# Patient Record
Sex: FEMALE | Race: BLACK OR AFRICAN AMERICAN | NOT HISPANIC OR LATINO | URBAN - METROPOLITAN AREA
[De-identification: names, ages, dates, MRNs, and addresses within clinical notes are randomized per-mention and may not be internally consistent; named-entity substitution may affect disease eponyms.]

---

## 2023-04-30 ENCOUNTER — EMERGENCY (EMERGENCY)
Facility: HOSPITAL | Age: 68
LOS: 0 days | Discharge: HOME | End: 2023-04-30
Attending: STUDENT IN AN ORGANIZED HEALTH CARE EDUCATION/TRAINING PROGRAM
Payer: MEDICARE

## 2023-04-30 VITALS
DIASTOLIC BLOOD PRESSURE: 81 MMHG | SYSTOLIC BLOOD PRESSURE: 126 MMHG | OXYGEN SATURATION: 98 % | HEART RATE: 82 BPM | TEMPERATURE: 98 F | RESPIRATION RATE: 16 BRPM

## 2023-04-30 VITALS
SYSTOLIC BLOOD PRESSURE: 148 MMHG | TEMPERATURE: 98 F | HEIGHT: 67 IN | HEART RATE: 100 BPM | WEIGHT: 214.95 LBS | RESPIRATION RATE: 18 BRPM | OXYGEN SATURATION: 99 % | DIASTOLIC BLOOD PRESSURE: 84 MMHG
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
## 2023-04-30 DIAGNOSIS — M62.838 OTHER MUSCLE SPASM: ICD-10-CM

## 2023-04-30 DIAGNOSIS — Z88.0 ALLERGY STATUS TO PENICILLIN: ICD-10-CM

## 2023-04-30 DIAGNOSIS — Z90.710 ACQUIRED ABSENCE OF BOTH CERVIX AND UTERUS: ICD-10-CM

## 2023-04-30 DIAGNOSIS — Z87.19 PERSONAL HISTORY OF OTHER DISEASES OF THE DIGESTIVE SYSTEM: ICD-10-CM

## 2023-04-30 DIAGNOSIS — Z94.84 STEM CELLS TRANSPLANT STATUS: ICD-10-CM

## 2023-04-30 DIAGNOSIS — Z94.84 STEM CELLS TRANSPLANT STATUS: Chronic | ICD-10-CM

## 2023-04-30 DIAGNOSIS — T14.8XXA OTHER INJURY OF UNSPECIFIED BODY REGION, INITIAL ENCOUNTER: ICD-10-CM

## 2023-04-30 DIAGNOSIS — Z90.710 ACQUIRED ABSENCE OF BOTH CERVIX AND UTERUS: Chronic | ICD-10-CM

## 2023-04-30 DIAGNOSIS — Z88.8 ALLERGY STATUS TO OTHER DRUGS, MEDICAMENTS AND BIOLOGICAL SUBSTANCES: ICD-10-CM

## 2023-04-30 DIAGNOSIS — E78.5 HYPERLIPIDEMIA, UNSPECIFIED: ICD-10-CM

## 2023-04-30 DIAGNOSIS — Z87.39 PERSONAL HISTORY OF OTHER DISEASES OF THE MUSCULOSKELETAL SYSTEM AND CONNECTIVE TISSUE: ICD-10-CM
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
## 2023-04-30 DIAGNOSIS — I10 ESSENTIAL (PRIMARY) HYPERTENSION: ICD-10-CM

## 2023-04-30 LAB
ALBUMIN SERPL ELPH-MCNC: 3.9 G/DL — SIGNIFICANT CHANGE UP (ref 3.3–5)
ALP SERPL-CCNC: 85 U/L — SIGNIFICANT CHANGE UP (ref 40–120)
ALT FLD-CCNC: 34 U/L — SIGNIFICANT CHANGE UP (ref 12–78)
ANION GAP SERPL CALC-SCNC: 2 MMOL/L — LOW (ref 5–17)
APTT BLD: 38.6 SEC — HIGH (ref 27.5–35.5)
AST SERPL-CCNC: 25 U/L — SIGNIFICANT CHANGE UP (ref 15–37)
BASOPHILS # BLD AUTO: 0.02 K/UL — SIGNIFICANT CHANGE UP (ref 0–0.2)
BASOPHILS NFR BLD AUTO: 0.2 % — SIGNIFICANT CHANGE UP (ref 0–2)
BILIRUB SERPL-MCNC: 0.6 MG/DL — SIGNIFICANT CHANGE UP (ref 0.2–1.2)
BUN SERPL-MCNC: 15 MG/DL — SIGNIFICANT CHANGE UP (ref 7–23)
CALCIUM SERPL-MCNC: 9.9 MG/DL — SIGNIFICANT CHANGE UP (ref 8.5–10.1)
CHLORIDE SERPL-SCNC: 103 MMOL/L — SIGNIFICANT CHANGE UP (ref 96–108)
CK MB BLD-MCNC: 0.5 % — SIGNIFICANT CHANGE UP (ref 0–3.5)
CK MB CFR SERPL CALC: 1.3 NG/ML — SIGNIFICANT CHANGE UP (ref 0.5–3.6)
CK SERPL-CCNC: 270 U/L — HIGH (ref 26–192)
CO2 SERPL-SCNC: 30 MMOL/L — SIGNIFICANT CHANGE UP (ref 22–31)
CREAT SERPL-MCNC: 1.18 MG/DL — SIGNIFICANT CHANGE UP (ref 0.5–1.3)
EGFR: 50 ML/MIN/1.73M2 — LOW
EOSINOPHIL # BLD AUTO: 0.09 K/UL — SIGNIFICANT CHANGE UP (ref 0–0.5)
EOSINOPHIL NFR BLD AUTO: 1.1 % — SIGNIFICANT CHANGE UP (ref 0–6)
GLUCOSE SERPL-MCNC: 148 MG/DL — HIGH (ref 70–99)
HCT VFR BLD CALC: 43.7 % — SIGNIFICANT CHANGE UP (ref 34.5–45)
HGB BLD-MCNC: 14.3 G/DL — SIGNIFICANT CHANGE UP (ref 11.5–15.5)
IMM GRANULOCYTES NFR BLD AUTO: 0.1 % — SIGNIFICANT CHANGE UP (ref 0–0.9)
INR BLD: 1.02 RATIO — SIGNIFICANT CHANGE UP (ref 0.88–1.16)
LYMPHOCYTES # BLD AUTO: 2.42 K/UL — SIGNIFICANT CHANGE UP (ref 1–3.3)
LYMPHOCYTES # BLD AUTO: 28.3 % — SIGNIFICANT CHANGE UP (ref 13–44)
MCHC RBC-ENTMCNC: 28.5 PG — SIGNIFICANT CHANGE UP (ref 27–34)
MCHC RBC-ENTMCNC: 32.7 G/DL — SIGNIFICANT CHANGE UP (ref 32–36)
MCV RBC AUTO: 87.2 FL — SIGNIFICANT CHANGE UP (ref 80–100)
MONOCYTES # BLD AUTO: 0.4 K/UL — SIGNIFICANT CHANGE UP (ref 0–0.9)
MONOCYTES NFR BLD AUTO: 4.7 % — SIGNIFICANT CHANGE UP (ref 2–14)
NEUTROPHILS # BLD AUTO: 5.62 K/UL — SIGNIFICANT CHANGE UP (ref 1.8–7.4)
NEUTROPHILS NFR BLD AUTO: 65.6 % — SIGNIFICANT CHANGE UP (ref 43–77)
NRBC # BLD: 0 /100 WBCS — SIGNIFICANT CHANGE UP (ref 0–0)
PLATELET # BLD AUTO: 252 K/UL — SIGNIFICANT CHANGE UP (ref 150–400)
POTASSIUM SERPL-MCNC: 3.5 MMOL/L — SIGNIFICANT CHANGE UP (ref 3.5–5.3)
POTASSIUM SERPL-SCNC: 3.5 MMOL/L — SIGNIFICANT CHANGE UP (ref 3.5–5.3)
PROT SERPL-MCNC: 8.1 GM/DL — SIGNIFICANT CHANGE UP (ref 6–8.3)
PROTHROM AB SERPL-ACNC: 12.1 SEC — SIGNIFICANT CHANGE UP (ref 10.5–13.4)
RBC # BLD: 5.01 M/UL — SIGNIFICANT CHANGE UP (ref 3.8–5.2)
RBC # FLD: 13.2 % — SIGNIFICANT CHANGE UP (ref 10.3–14.5)
SODIUM SERPL-SCNC: 135 MMOL/L — SIGNIFICANT CHANGE UP (ref 135–145)
TROPONIN I, HIGH SENSITIVITY RESULT: 8.2 NG/L — SIGNIFICANT CHANGE UP
WBC # BLD: 8.56 K/UL — SIGNIFICANT CHANGE UP (ref 3.8–10.5)
WBC # FLD AUTO: 8.56 K/UL — SIGNIFICANT CHANGE UP (ref 3.8–10.5)

## 2023-04-30 PROCEDURE — 99284 EMERGENCY DEPT VISIT MOD MDM: CPT

## 2023-04-30 PROCEDURE — 93010 ELECTROCARDIOGRAM REPORT: CPT

## 2023-04-30 RX ORDER — METOPROLOL TARTRATE 50 MG
1 TABLET ORAL
Refills: 0 | DISCHARGE

## 2023-04-30 RX ORDER — ATORVASTATIN CALCIUM 80 MG/1
1 TABLET, FILM COATED ORAL
Refills: 0 | DISCHARGE

## 2023-04-30 RX ORDER — HYDROCHLOROTHIAZIDE 25 MG
1 TABLET ORAL
Refills: 0 | DISCHARGE

## 2023-04-30 RX ORDER — NIFEDIPINE 30 MG
1 TABLET, EXTENDED RELEASE 24 HR ORAL
Refills: 0 | DISCHARGE

## 2023-04-30 RX ORDER — METHOCARBAMOL 500 MG/1
1000 TABLET, FILM COATED ORAL ONCE
Refills: 0 | Status: COMPLETED | OUTPATIENT
Start: 2023-04-30 | End: 2023-04-30

## 2023-04-30 RX ORDER — METHOCARBAMOL 500 MG/1
2 TABLET, FILM COATED ORAL
Qty: 30 | Refills: 0
Start: 2023-04-30 | End: 2023-05-04

## 2023-04-30 RX ADMIN — METHOCARBAMOL 1000 MILLIGRAM(S): 500 TABLET, FILM COATED ORAL at 17:54

## 2023-04-30 NOTE — ED ADULT NURSE NOTE - NSICDXFAMILYHX_GEN_ALL_CORE_FT
FAMILY HISTORY:  Father  Still living? Unknown  FH: HTN (hypertension), Age at diagnosis: Age Unknown    Mother  Still living? Unknown  FH: HTN (hypertension), Age at diagnosis: Age Unknown    Grandparent  Still living? Unknown  FH: HTN (hypertension), Age at diagnosis: Age Unknown

## 2023-04-30 NOTE — ED PROVIDER NOTE - PATIENT PORTAL LINK FT
You can access the FollowMyHealth Patient Portal offered by [facility] by registering at the following website: [email or web address]. By joining [email or web address]’s FollowMyHealth portal, you will also be able to view your health information using other applications (apps) compatible with our system.

## 2023-04-30 NOTE — ED PROVIDER NOTE - CLINICAL SUMMARY MEDICAL DECISION MAKING FREE TEXT BOX
pt presents today c/o left lower extremity muscle spasms, has had this in the past, related to hypokalemia, pt also recent was at the gym and bench pressed 18 pounds, on exam no edema or calf pains noted, labs to rule out rhabdo and hypokalemia, robaxin given, will reassess and dispo

## 2023-04-30 NOTE — ED PROVIDER NOTE - OBJECTIVE STATEMENT
68 year old female with h/o HTN, HLD, GERD presents today c/o left lower extremity muscle spasms, pt states that she use to get them frequently and was related to hypokalemia due to her bp meds, pt does currently work out, on wednesday did bench presses using 18 pound weights,  (-) travel (-) swelling 68 year old female with h/o HTN, HLD, GERD presents today c/o left lower extremity muscle spasms, pt states that she use to get them frequently and was related to hypokalemia due to her bp meds, pt does currently work out, on wednesday did bench presses using 18 pound weights,  (-) travel (-) swelling (-) chest pain (-) sob

## 2023-04-30 NOTE — ED ADULT TRIAGE NOTE - CHIEF COMPLAINT QUOTE
pt c/o  intermittent left leg muscle spasm today. pt shoulder arm and left arm tightness today. hx: degenerative disc disease, HTN, high cholesterol. pt c/o  intermittent left leg muscle spasm today. pt left jaw, shoulder arm tightness today. hx: degenerative disc disease, HTN, high cholesterol.

## 2023-04-30 NOTE — ED ADULT NURSE NOTE - OBJECTIVE STATEMENT
patient is A&Ox4. Breathing unlabored on RA. patient appears anxious. PMH acid reflux, HTN, HLD, degenerative lumbar disc. patient complaining of intermittent left leg spasm. patient states it started while she was "marching at the [employer]". reports working out in the gym recently and lifting weights. denies any injuries, fall or trauma. reports similar episodes of leg spasm in the past. patient complaining of left jaw tightness/pain and left shoulder tightness. EKG done in triage. patient denies any numbness/tingling, cp, sob, difficulty breathing, abdominal pain, n/v/d, fever, chills. patient denies taking anything for pain prior to coming to ED. patient noted with active ROM in all extremities.

## 2023-04-30 NOTE — ED PROVIDER NOTE - NSICDXPASTMEDICALHX_GEN_ALL_CORE_FT
PAST MEDICAL HISTORY:  Acid reflux     Degenerative lumbar disc     HLD (hyperlipidemia)     HTN (hypertension)

## 2023-04-30 NOTE — ED ADULT NURSE NOTE - CHIEF COMPLAINT QUOTE
pt c/o  intermittent left leg muscle spasm today. pt left jaw, shoulder arm tightness today. hx: degenerative disc disease, HTN, high cholesterol.

## 2023-04-30 NOTE — ED ADULT NURSE NOTE - ED STAT RN HANDOFF DETAILS
Report given to Vane Yang RN. patient in stretcher. Breathing unlabored on RA. No acute or respiratory distress noted. endorsed all concerns to RN.

## 2023-04-30 NOTE — ED ADULT TRIAGE NOTE - HAVE YOU HAD COVID IN THE LAST 60 DAYS?
I have reviewed and agree to the content of the note written by the PTA.   Electronically signed by Johnny Moore PT [id number] No

## 2023-07-19 ENCOUNTER — OFFICE (OUTPATIENT)
Dept: URBAN - METROPOLITAN AREA CLINIC 35 | Facility: CLINIC | Age: 68
Setting detail: OPHTHALMOLOGY
End: 2023-07-19
Payer: MEDICARE

## 2023-07-19 DIAGNOSIS — D31.01: ICD-10-CM

## 2023-07-19 DIAGNOSIS — H11.153: ICD-10-CM

## 2023-07-19 DIAGNOSIS — H25.13: ICD-10-CM

## 2023-07-19 DIAGNOSIS — H44.23: ICD-10-CM

## 2023-07-19 PROCEDURE — 92014 COMPRE OPH EXAM EST PT 1/>: CPT | Performed by: OPHTHALMOLOGY

## 2023-07-19 ASSESSMENT — AXIALLENGTH_DERIVED
OS_AL: 28.5532
OD_AL: 28.5368
OD_AL: 28.6801
OS_AL: 29.0614
OD_AL: 28.5368
OS_AL: 28.6247
OS_AL: 28.5532
OD_AL: 28.5368

## 2023-07-19 ASSESSMENT — REFRACTION_CURRENTRX
OS_ADD: +3.00
OD_SPHERE: -10.00
OS_OVR_VA: 20/
OD_VPRISM_DIRECTION: PROGS
OD_AXIS: 130
OD_CYLINDER: -0.50
OD_OVR_VA: 20/
OS_SPHERE: -11.00
OS_AXIS: 060
OS_CYLINDER: -0.50
OS_VPRISM_DIRECTION: PROGS
OD_ADD: +3.00

## 2023-07-19 ASSESSMENT — SPHEQUIV_DERIVED
OS_SPHEQUIV: -10.875
OD_SPHEQUIV: -10.625
OS_SPHEQUIV: -10.875
OD_SPHEQUIV: -10.375
OD_SPHEQUIV: -10.375
OS_SPHEQUIV: -11
OD_SPHEQUIV: -10.375
OS_SPHEQUIV: -11.75

## 2023-07-19 ASSESSMENT — KERATOMETRY
OD_K1POWER_DIOPTERS: 43.00
OS_K2POWER_DIOPTERS: 44.50
OD_K2POWER_DIOPTERS: 43.75
OD_AXISANGLE_DEGREES: 077
OS_AXISANGLE_DEGREES: 134
OS_K1POWER_DIOPTERS: 43.25

## 2023-07-19 ASSESSMENT — REFRACTION_MANIFEST
OD_AXIS: 116
OS_SPHERE: -10.50
OS_VA1: 20/30-2
OD_AXIS: 125
OS_ADD: +3.00
OS_CYLINDER: -2.25
OS_AXIS: 055
OS_CYLINDER: -1.00
OS_CYLINDER: -2.50
OD_VA1: 20/25-2
OS_AXIS: 006
OS_SPHERE: -10.50
OD_CYLINDER: -0.75
OD_VA1: 20/25-2
OD_SPHERE: -10.00
OD_SPHERE: -10.00
OS_SPHERE: -9.75
OD_AXIS: 130
OS_VA1: 20/25-3
OD_ADD: +3.00
OD_SPHERE: -10.00
OD_CYLINDER: -1.25
OS_AXIS: 050
OD_CYLINDER: -0.75

## 2023-07-19 ASSESSMENT — TONOMETRY: OS_IOP_MMHG: 18

## 2023-07-19 ASSESSMENT — REFRACTION_AUTOREFRACTION
OS_CYLINDER: -2.25
OD_AXIS: 116
OD_CYLINDER: -0.75
OS_AXIS: 06
OS_SPHERE: -9.75
OD_SPHERE: -10.00

## 2023-07-19 ASSESSMENT — CONFRONTATIONAL VISUAL FIELD TEST (CVF)
OD_FINDINGS: FULL
OS_FINDINGS: FULL

## 2023-07-19 ASSESSMENT — VISUAL ACUITY
OS_BCVA: 20/20-3
OD_BCVA: 20/20-3

## 2024-04-22 ENCOUNTER — OFFICE (OUTPATIENT)
Dept: URBAN - METROPOLITAN AREA CLINIC 35 | Facility: CLINIC | Age: 69
Setting detail: OPHTHALMOLOGY
End: 2024-04-22
Payer: MEDICARE

## 2024-04-22 DIAGNOSIS — D31.01: ICD-10-CM

## 2024-04-22 DIAGNOSIS — H44.23: ICD-10-CM

## 2024-04-22 DIAGNOSIS — H25.13: ICD-10-CM

## 2024-04-22 DIAGNOSIS — H11.153: ICD-10-CM

## 2024-04-22 PROCEDURE — 92014 COMPRE OPH EXAM EST PT 1/>: CPT | Performed by: OPHTHALMOLOGY

## 2025-02-09 ENCOUNTER — DOCTOR'S OFFICE (OUTPATIENT)
Facility: LOCATION | Age: 70
Setting detail: OPHTHALMOLOGY
End: 2025-02-09
Payer: MEDICARE

## 2025-02-09 DIAGNOSIS — D31.01: ICD-10-CM

## 2025-02-09 DIAGNOSIS — H11.153: ICD-10-CM

## 2025-02-09 DIAGNOSIS — H11.31: ICD-10-CM

## 2025-02-09 PROCEDURE — 92012 INTRM OPH EXAM EST PATIENT: CPT | Performed by: STUDENT IN AN ORGANIZED HEALTH CARE EDUCATION/TRAINING PROGRAM

## 2025-02-09 ASSESSMENT — KERATOMETRY
OS_AXISANGLE_DEGREES: 124
OD_K2POWER_DIOPTERS: 44.50
OD_AXISANGLE_DEGREES: 073
OS_K2POWER_DIOPTERS: 44.75
OS_K1POWER_DIOPTERS: 43.75
OD_K1POWER_DIOPTERS: 43.00

## 2025-02-09 ASSESSMENT — REFRACTION_AUTOREFRACTION
OD_SPHERE: -10.00
OD_CYLINDER: -0.75
OD_AXIS: 112
OS_SPHERE: -10.50
OS_CYLINDER: -2.00
OS_AXIS: 061

## 2025-02-09 ASSESSMENT — REFRACTION_CURRENTRX
OS_CYLINDER: -0.50
OS_SPHERE: -11.00
OD_OVR_VA: 20/
OS_OVR_VA: 20/
OS_ADD: +3.00
OS_VPRISM_DIRECTION: PROGS
OS_AXIS: 060
OD_VPRISM_DIRECTION: PROGS
OD_ADD: +3.00
OS_OVR_VA: 20/
OD_CYLINDER: -0.50
OD_OVR_VA: 20/
OD_AXIS: 130
OD_SPHERE: -10.00

## 2025-02-09 ASSESSMENT — REFRACTION_MANIFEST
OD_SPHERE: -10.00
OS_CYLINDER: -2.50
OS_SPHERE: -10.25
OS_AXIS: 050
OD_ADD: +3.00
OS_CYLINDER: -1.00
OS_CYLINDER: -2.25
OD_CYLINDER: -0.75
OS_VA1: 20/25-3
OS_AXIS: 006
OD_VA1: 20/25-2
OS_VA1: 20/30-2
OS_AXIS: 055
OD_SPHERE: -10.00
OD_AXIS: 116
OS_AXIS: 065
OD_AXIS: 125
OD_CYLINDER: -1.25
OS_CYLINDER: -2.00
OD_SPHERE: -10.00
OS_ADD: +3.00
OS_SPHERE: -9.75
OD_CYLINDER: -0.75
OD_VA1: 20/25-2
OS_SPHERE: -10.50
OD_AXIS: 130
OS_VA1: 20/30-2
OS_SPHERE: -10.50

## 2025-02-09 ASSESSMENT — VISUAL ACUITY
OD_BCVA: 20/25-2
OS_BCVA: 20/25-2

## 2025-02-09 ASSESSMENT — CONFRONTATIONAL VISUAL FIELD TEST (CVF)
OS_FINDINGS: FULL
OD_FINDINGS: FULL

## 2025-04-29 ENCOUNTER — RX ONLY (RX ONLY)
Age: 70
End: 2025-04-29

## 2025-04-29 ENCOUNTER — DOCTOR'S OFFICE (OUTPATIENT)
Facility: LOCATION | Age: 70
Setting detail: OPHTHALMOLOGY
End: 2025-04-29
Payer: MEDICARE

## 2025-04-29 DIAGNOSIS — H52.13: ICD-10-CM

## 2025-04-29 DIAGNOSIS — D31.01: ICD-10-CM

## 2025-04-29 DIAGNOSIS — H43.393: ICD-10-CM

## 2025-04-29 DIAGNOSIS — H11.153: ICD-10-CM

## 2025-04-29 DIAGNOSIS — H44.23: ICD-10-CM

## 2025-04-29 DIAGNOSIS — H25.13: ICD-10-CM

## 2025-04-29 PROCEDURE — 92250 FUNDUS PHOTOGRAPHY W/I&R: CPT | Performed by: OPHTHALMOLOGY

## 2025-04-29 PROCEDURE — 92014 COMPRE OPH EXAM EST PT 1/>: CPT | Performed by: OPHTHALMOLOGY

## 2025-04-29 ASSESSMENT — REFRACTION_CURRENTRX
OS_VPRISM_DIRECTION: PROGS
OD_VPRISM_DIRECTION: PROGS
OD_OVR_VA: 20/
OD_VPRISM_DIRECTION: PROGS
OD_OVR_VA: 20/
OS_SPHERE: -11.00
OS_CYLINDER: -0.50
OD_AXIS: 130
OS_SPHERE: -10.50
OS_OVR_VA: 20/
OS_OVR_VA: 20/
OD_OVR_VA: 20/
OS_AXIS: 054
OD_CYLINDER: -0.50
OS_VPRISM_DIRECTION: PROGS
OS_CYLINDER: -1.00
OS_OVR_VA: 20/
OD_SPHERE: -10.00
OD_AXIS: 120
OS_AXIS: 060
OD_SPHERE: -10.00
OD_CYLINDER: -0.75
OD_ADD: +3.00
OS_ADD: +3.00
OS_ADD: +3.00
OD_ADD: +3.00

## 2025-04-29 ASSESSMENT — REFRACTION_MANIFEST
OS_SPHERE: -9.75
OD_CYLINDER: -1.25
OD_SPHERE: -9.25
OS_SPHERE: -10.25
OD_AXIS: 130
OS_VA1: 20/30-2
OD_VA1: 20/25-2
OS_AXIS: 070
OD_CYLINDER: -0.75
OD_AXIS: 116
OS_AXIS: 065
OS_SPHERE: -10.50
OD_CYLINDER: -1.25
OS_AXIS: 050
OS_CYLINDER: -1.00
OD_CYLINDER: -0.75
OD_VA1: 20/25-2
OS_VA1: 20/25-3
OD_SPHERE: -10.00
OD_AXIS: 125
OD_VA1: 20/25
OS_AXIS: 055
OS_CYLINDER: -2.25
OD_SPHERE: -10.00
OS_VA1: 20/25
OS_SPHERE: -10.50
OS_ADD: +3.00
OS_CYLINDER: -2.00
OD_SPHERE: -10.00
OS_AXIS: 006
OD_AXIS: 110
OS_VA1: 20/30-2
OS_CYLINDER: -2.50
OS_SPHERE: -9.75
OD_ADD: +3.00
OS_CYLINDER: -2.25
OU_VA: 20/25

## 2025-04-29 ASSESSMENT — KERATOMETRY
OS_AXISANGLE_DEGREES: 130
OS_K2POWER_DIOPTERS: 45.00
OD_K2POWER_DIOPTERS: 44.00
OD_K1POWER_DIOPTERS: 43.00
OS_K1POWER_DIOPTERS: 43.50
OD_AXISANGLE_DEGREES: 065

## 2025-04-29 ASSESSMENT — REFRACTION_AUTOREFRACTION
OD_CYLINDER: -1.50
OS_CYLINDER: -2.25
OS_AXIS: 073
OD_AXIS: 112
OS_SPHERE: -10.25
OD_SPHERE: -9.50

## 2025-04-29 ASSESSMENT — CONFRONTATIONAL VISUAL FIELD TEST (CVF)
OS_FINDINGS: FULL
OD_FINDINGS: FULL

## 2025-04-29 ASSESSMENT — TONOMETRY
OD_IOP_MMHG: 18
OS_IOP_MMHG: 18

## 2025-04-29 ASSESSMENT — VISUAL ACUITY
OD_BCVA: 20/25-2
OS_BCVA: 20/30 -1

## 2025-06-30 ENCOUNTER — DOCTOR'S OFFICE (OUTPATIENT)
Facility: LOCATION | Age: 70
Setting detail: OPHTHALMOLOGY
End: 2025-06-30
Payer: MEDICARE

## 2025-06-30 DIAGNOSIS — H43.393: ICD-10-CM

## 2025-06-30 DIAGNOSIS — H44.23: ICD-10-CM

## 2025-06-30 PROCEDURE — 92134 CPTRZ OPH DX IMG PST SGM RTA: CPT | Performed by: OPHTHALMOLOGY

## 2025-06-30 PROCEDURE — 99213 OFFICE O/P EST LOW 20 MIN: CPT | Performed by: OPHTHALMOLOGY

## 2025-06-30 ASSESSMENT — VISUAL ACUITY
OS_BCVA: 20/25-2
OD_BCVA: 20/25-1